# Patient Record
Sex: FEMALE | Race: WHITE
[De-identification: names, ages, dates, MRNs, and addresses within clinical notes are randomized per-mention and may not be internally consistent; named-entity substitution may affect disease eponyms.]

---
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
## 2023-08-09 ENCOUNTER — APPOINTMENT (OUTPATIENT)
Dept: OBGYN | Facility: CLINIC | Age: 18
End: 2023-08-09
Payer: COMMERCIAL

## 2023-08-09 VITALS
WEIGHT: 106 LBS | DIASTOLIC BLOOD PRESSURE: 70 MMHG | SYSTOLIC BLOOD PRESSURE: 110 MMHG | BODY MASS INDEX: 17.66 KG/M2 | HEIGHT: 65 IN

## 2023-08-09 DIAGNOSIS — N92.0 EXCESSIVE AND FREQUENT MENSTRUATION WITH REGULAR CYCLE: ICD-10-CM

## 2023-08-09 DIAGNOSIS — Z87.19 PERSONAL HISTORY OF OTHER DISEASES OF THE DIGESTIVE SYSTEM: ICD-10-CM

## 2023-08-09 DIAGNOSIS — Z01.411 ENCOUNTER FOR GYNECOLOGICAL EXAMINATION (GENERAL) (ROUTINE) WITH ABNORMAL FINDINGS: ICD-10-CM

## 2023-08-09 DIAGNOSIS — Z72.0 TOBACCO USE: ICD-10-CM

## 2023-08-09 DIAGNOSIS — Z80.3 FAMILY HISTORY OF MALIGNANT NEOPLASM OF BREAST: ICD-10-CM

## 2023-08-09 PROCEDURE — 99395 PREV VISIT EST AGE 18-39: CPT

## 2023-08-20 PROBLEM — Z72.0 CURRENT OCCASIONAL SMOKER: Status: ACTIVE | Noted: 2023-08-09

## 2023-08-20 PROBLEM — Z87.19 HISTORY OF CHRONIC CONSTIPATION: Status: RESOLVED | Noted: 2023-08-09 | Resolved: 2023-08-20

## 2023-08-20 PROBLEM — Z80.3 FAMILY HISTORY OF MALIGNANT NEOPLASM OF BREAST: Status: ACTIVE | Noted: 2023-08-09

## 2023-08-20 PROBLEM — Z01.411 ENCOUNTER FOR GYNECOLOGICAL EXAMINATION (GENERAL) (ROUTINE) WITH ABNORMAL FINDINGS: Status: ACTIVE | Noted: 2021-06-14

## 2023-08-20 PROBLEM — N92.0 MENORRHAGIA WITH REGULAR CYCLE: Status: ACTIVE | Noted: 2023-08-20

## 2023-08-20 NOTE — PLAN
[FreeTextEntry1] : Tx for menorrhagia discussed again. She declines OCPs. Pt. declines Lyseda due to headaches and will likely have Kyleena IUD inserted when she is home from school during her next break.

## 2023-08-20 NOTE — PHYSICAL EXAM
[Appropriately responsive] : appropriately responsive [Alert] : alert [Soft] : soft [Non-tender] : non-tender [Non-distended] : non-distended [Oriented x3] : oriented x3 [Normal] : normal [Uterine Adnexae] : normal [No Acute Distress] : no acute distress [FreeTextEntry6] : No masses, nontender, no nipple discharge , no axillary lymphadenopathy

## 2023-08-24 ENCOUNTER — NON-APPOINTMENT (OUTPATIENT)
Age: 18
End: 2023-08-24

## 2024-08-13 ENCOUNTER — APPOINTMENT (OUTPATIENT)
Dept: OBGYN | Facility: CLINIC | Age: 19
End: 2024-08-13
Payer: COMMERCIAL

## 2024-08-13 VITALS
SYSTOLIC BLOOD PRESSURE: 112 MMHG | WEIGHT: 112 LBS | BODY MASS INDEX: 18.66 KG/M2 | HEIGHT: 65 IN | DIASTOLIC BLOOD PRESSURE: 64 MMHG

## 2024-08-13 DIAGNOSIS — N92.0 EXCESSIVE AND FREQUENT MENSTRUATION WITH REGULAR CYCLE: ICD-10-CM

## 2024-08-13 DIAGNOSIS — Z01.411 ENCOUNTER FOR GYNECOLOGICAL EXAMINATION (GENERAL) (ROUTINE) WITH ABNORMAL FINDINGS: ICD-10-CM

## 2024-08-13 DIAGNOSIS — Z11.3 ENCOUNTER FOR SCREENING FOR INFECTIONS WITH A PREDOMINANTLY SEXUAL MODE OF TRANSMISSION: ICD-10-CM

## 2024-08-13 DIAGNOSIS — N89.8 OTHER SPECIFIED NONINFLAMMATORY DISORDERS OF VAGINA: ICD-10-CM

## 2024-08-13 DIAGNOSIS — N94.6 DYSMENORRHEA, UNSPECIFIED: ICD-10-CM

## 2024-08-13 PROCEDURE — 99395 PREV VISIT EST AGE 18-39: CPT

## 2024-08-13 NOTE — END OF VISIT
[TextEntry] : IDeyanira, am scribing for and the presence of Dr. Lesly Ortez the following sections HISTORY OF PRESENT ILLNESS, PAST MEDICAL/FAMILY/SOCIAL HISTORY; REVIEW OF SYSTEMS; PHYSICAL EXAM; DISPOSITION.

## 2024-08-13 NOTE — PHYSICAL EXAM
[Appropriately responsive] : appropriately responsive [Alert] : alert [No Acute Distress] : no acute distress [Soft] : soft [Non-tender] : non-tender [Non-distended] : non-distended [Oriented x3] : oriented x3 [Normal] : normal [Uterine Adnexae] : normal [FreeTextEntry6] : No masses, nontender, no nipple discharge , no axillary lymphadenopathy  [FreeTextEntry4] : White normal appearing physiological discharge

## 2024-08-13 NOTE — PLAN
[FreeTextEntry1] : Tx for menorrhagia discussed. Pt. still declines OCPs. Recommend trying Kyleena IUD. Pt. will consider. BV cultures performed.

## 2024-08-14 LAB
C TRACH RRNA SPEC QL NAA+PROBE: NOT DETECTED
CANDIDA VAG CYTO: NOT DETECTED
G VAGINALIS+PREV SP MTYP VAG QL MICRO: NOT DETECTED
N GONORRHOEA RRNA SPEC QL NAA+PROBE: NOT DETECTED
SOURCE TP AMPLIFICATION: NORMAL
T VAGINALIS VAG QL WET PREP: NOT DETECTED

## 2024-08-18 LAB — CYTOLOGY CVX/VAG DOC THIN PREP: NORMAL

## 2025-04-25 NOTE — END OF VISIT
Unknown if ever smoked [>50% of Time Spent on Coordination of Care for  ___] : Greater than 50% of the encounter time was spent on coordination of care for [unfilled] [Time Spent: ___ minutes] : I have spent [unfilled] minutes of face to face time with the patient

## 2025-08-18 ENCOUNTER — APPOINTMENT (OUTPATIENT)
Dept: OBGYN | Facility: CLINIC | Age: 20
End: 2025-08-18
Payer: COMMERCIAL

## 2025-08-18 VITALS
HEIGHT: 65 IN | HEART RATE: 76 BPM | WEIGHT: 121.3 LBS | SYSTOLIC BLOOD PRESSURE: 110 MMHG | TEMPERATURE: 98.9 F | DIASTOLIC BLOOD PRESSURE: 70 MMHG | BODY MASS INDEX: 20.21 KG/M2

## 2025-08-18 DIAGNOSIS — Z86.59 PERSONAL HISTORY OF OTHER MENTAL AND BEHAVIORAL DISORDERS: ICD-10-CM

## 2025-08-18 DIAGNOSIS — Z01.419 ENCOUNTER FOR GYNECOLOGICAL EXAMINATION (GENERAL) (ROUTINE) W/OUT ABNORMAL FINDINGS: ICD-10-CM

## 2025-08-18 PROCEDURE — 99395 PREV VISIT EST AGE 18-39: CPT

## 2025-08-21 LAB — CYTOLOGY CVX/VAG DOC THIN PREP: NORMAL

## 2025-08-24 PROBLEM — Z86.59 HISTORY OF EATING DISORDER: Status: RESOLVED | Noted: 2025-08-18 | Resolved: 2025-08-24
